# Patient Record
Sex: FEMALE | Race: WHITE | Employment: OTHER | URBAN - METROPOLITAN AREA
[De-identification: names, ages, dates, MRNs, and addresses within clinical notes are randomized per-mention and may not be internally consistent; named-entity substitution may affect disease eponyms.]

---

## 2022-09-03 ENCOUNTER — HOSPITAL ENCOUNTER (EMERGENCY)
Age: 77
Discharge: HOME | End: 2022-09-03
Payer: MEDICARE

## 2022-09-03 VITALS
OXYGEN SATURATION: 98 % | RESPIRATION RATE: 18 BRPM | HEART RATE: 99 BPM | TEMPERATURE: 98.4 F | SYSTOLIC BLOOD PRESSURE: 132 MMHG | DIASTOLIC BLOOD PRESSURE: 73 MMHG

## 2022-09-03 DIAGNOSIS — H61.21 IMPACTED CERUMEN OF RIGHT EAR: Primary | ICD-10-CM

## 2022-09-03 PROCEDURE — 69209 REMOVE IMPACTED EAR WAX UNI: CPT

## 2022-09-03 PROCEDURE — 99283 EMERGENCY DEPT VISIT LOW MDM: CPT

## 2022-09-03 RX ORDER — CIPROFLOXACIN/HYDROCORTISONE 0.2 %-1 %
3 SUSPENSION, DROPS(FINAL DOSAGE FORM)(ML) OTIC (EAR) 2 TIMES DAILY
Qty: 1 EACH | Refills: 0 | Status: SHIPPED | OUTPATIENT
Start: 2022-09-03 | End: 2022-09-10

## 2022-09-03 ASSESSMENT — PAIN SCALES - GENERAL: PAINLEVEL_OUTOF10: 2

## 2022-09-03 ASSESSMENT — PAIN - FUNCTIONAL ASSESSMENT: PAIN_FUNCTIONAL_ASSESSMENT: 0-10

## 2022-09-03 NOTE — ED NOTES
Moderate ear wax removed from right ear, pt felt relief and increase in hearing post removal     Husamma Franky, RN  09/03/22 [id number]

## 2022-09-03 NOTE — ED PROVIDER NOTES
[facility] Emergency Department    CHIEF COMPLAINT  Ear Fullness (Right ear pain x2 weeks)      SHARED SERVICE VISIT:  Evaluated by [facility]. My supervising physician was available for consultation. HISTORY OF PRESENT ILLNESS  Yvette Rodriguez is a 76 y.o. female who presents to the ED complaining of 2-week history of low level right ear fullness and decreased hearing. States that she did have some discomfort in today attempting to remove some earwax. Mild bleeding was then noted. Patient denies any visual changes or disturbances. No dizziness or lightheadedness. She denies headaches or confusion. No fevers chills. No nasal congestion sore throat. No other complaints, modifying factors or associated symptoms. Nursing notes reviewed.    Past Medical History:   Diagnosis Date    Cancer ([facility] [address]     skin-cheek     Past Surgical History:   Procedure Laterality Date    APPENDECTOMY  second grade    UPPER GASTROINTESTINAL ENDOSCOPY  5/12/2015     Family History   Problem Relation Age of Onset    Cancer Mother         liver     Social History     Socioeconomic History    Marital status:      Spouse name: Not on file    Number of children: Not on file    Years of education: Not on file    Highest education level: Not on file   Occupational History    Not on file   Tobacco Use    Smoking status: Never    Smokeless tobacco: Never   Substance and Sexual Activity    Alcohol use: Yes     Comment: varies    Drug use: Not on file    Sexual activity: Not on file   Other Topics Concern    Not on file   Social History Narrative    Not on file     Social Determinants of Health     Financial Resource Strain: Not on file   Food Insecurity: Not on file   Transportation Needs: Not on file   Physical Activity: Not on file   Stress: Not on file   Social Connections: Not on file   Intimate Partner Violence: Not on file   Housing Stability: Not on file     No current facility-administered medications for this encounter. Current Outpatient Medications   Medication Sig Dispense Refill    ciprofloxacin-hydrocortisone (CIPRO HC) 0.2-1 % otic suspension Place 3 drops into the left ear 2 times daily for 7 days 1 each 0    dexamethasone (DECADRON) 4 MG tablet Take 4 mg by mouth 2 times daily (with meals)      Multiple Vitamins-Minerals (THERAPEUTIC MULTIVITAMIN-MINERALS) tablet Take 1 tablet by mouth daily       Allergies   Allergen Reactions    Codeine Nausea Only and Other (See Comments)     \"passed out\"    Streptomycin Nausea Only and Other (See Comments)     \"passed out\"    Iv Contrast [Iodides] Diarrhea, Itching, Nausea And Vomiting and Other (See Comments)     Itching, hypotension and syncope after 2nd CT with IV contrast.  Has n/v/d after first CT with contrast       REVIEW OF SYSTEMS  6 systems reviewed, pertinent positives per HPI otherwise noted to be negative    PHYSICAL EXAM  /73   Pulse 99   Temp 98.4 °F (36.9 °C) (Oral)   Resp 18   SpO2 98%   GENERAL APPEARANCE: Awake and alert. Cooperative. No acute distress. HEAD: Normocephalic. Atraumatic. EYES: PERRL. EOM's grossly intact. ENT: Mucous membranes are moist.  Patient with mild sore to the right ear canal.  Cerumen impaction was noted and resolved. No pain with manipulation of external ear. No mastoid tenderness. Canal is pink and pearly without bulge, retraction, or loss of landmarks. Left ear unremarkable. LYMPH: No periauricular, submental, or cervical lymphadenopathy.]  NECK: Supple. Normal ROM. CHEST: Equal symmetric chest rise. LUNGS: Breathing is unlabored. Speaking comfortably in full sentences. Abdomen: Nondistended  EXTREMITIES: MAEE. No acute deformities. SKIN: Warm and dry. NEUROLOGICAL: Alert and oriented. Strength is 5/5 in all extremities and sensation is intact. ED COURSE  Pain control was not required while here in the emergency department.   Cerumen removed and on reevaluation patient states that she

## 2023-02-11 ENCOUNTER — OFFICE VISIT (OUTPATIENT)
Dept: URGENT CARE | Age: 78
End: 2023-02-11

## 2023-02-11 VITALS
HEART RATE: 85 BPM | TEMPERATURE: 98.7 F | BODY MASS INDEX: 18.12 KG/M2 | WEIGHT: 96 LBS | DIASTOLIC BLOOD PRESSURE: 77 MMHG | HEIGHT: 61 IN | OXYGEN SATURATION: 96 % | SYSTOLIC BLOOD PRESSURE: 127 MMHG

## 2023-02-11 DIAGNOSIS — L50.8 ACUTE URTICARIA: ICD-10-CM

## 2023-02-11 DIAGNOSIS — T78.1XXA ALLERGIC REACTION TO FOOD, INITIAL ENCOUNTER: Primary | ICD-10-CM

## 2023-02-11 RX ORDER — CYCLOBENZAPRINE HCL 5 MG
TABLET ORAL
COMMUNITY
Start: 2023-01-13

## 2023-02-11 RX ORDER — CARBAMAZEPINE 300 MG/1
CAPSULE, EXTENDED RELEASE ORAL
COMMUNITY
Start: 2023-01-21

## 2023-02-11 RX ORDER — DIPHENHYDRAMINE HCL 25 MG
25 TABLET ORAL 2 TIMES DAILY
Qty: 60 TABLET | Refills: 0 | Status: SHIPPED | OUTPATIENT
Start: 2023-02-11 | End: 2023-03-13

## 2023-02-11 RX ORDER — FAMOTIDINE 20 MG/1
20 TABLET, FILM COATED ORAL 2 TIMES DAILY
Qty: 28 TABLET | Refills: 0 | Status: SHIPPED | OUTPATIENT
Start: 2023-02-11 | End: 2023-02-25

## 2023-02-11 RX ADMIN — Medication 25 MG: at 12:13

## 2023-02-11 NOTE — PATIENT INSTRUCTIONS
Start pepcid and benadryl twice daily for five days  May take Benadryl as needed every 6 hours for itching  Follow up with PCP if no improvement  Go to the ER for shortness of breath, trouble swallowing or increased swelling of face.

## 2023-02-11 NOTE — PROGRESS NOTES
Noelle Delgado (:  1945) is a [de-identified] y.o. female,New patient, here for evaluation of the following chief complaint(s):  Rash (Rash on face. Itchy last night, red rash this morning. Burns and itches. also couldn't see out of Right eye for a short time. Has infusions via IV for her bone marrow for red and white blood cell count. Had one 2 days ago and 3 months prior. Did not have reaction the last time. )      ASSESSMENT/PLAN:  1. Allergic reaction to food, initial encounter  Facial swelling, redness and itching improved with Benadryl in office. Start pepcid and benadryl twice daily  Avoid jalapeno  Go to the ER if swelling increases, become SOB or have trouble swallowing.    - famotidine (PEPCID) 20 MG tablet; Take 1 tablet by mouth 2 times daily for 14 days  Dispense: 28 tablet; Refill: 0  - diphenhydrAMINE (BENADRYL) 25 MG tablet; Take 1 tablet by mouth 2 times daily  Dispense: 60 tablet; Refill: 0    2. Acute urticaria  Orders Placed This Encounter   Medications    diphenhydrAMINE (BENYLIN) 12.5 MG/5ML liquid 25 mg    famotidine (PEPCID) 20 MG tablet     Sig: Take 1 tablet by mouth 2 times daily for 14 days     Dispense:  28 tablet     Refill:  0    diphenhydrAMINE (BENADRYL) 25 MG tablet     Sig: Take 1 tablet by mouth 2 times daily     Dispense:  60 tablet     Refill:  0    Patient had noticeable relief of redness and swelling of right side of face within minutes of benadryl dose.     - diphenhydrAMINE (BENYLIN) 12.5 MG/5ML liquid 25 mg     Return if symptoms worsen or fail to improve. SUBJECTIVE/OBJECTIVE:  Patient comes in today for rash to face since yesterday, rash worsened overnight. States she is allergic to jalopenos and accidentally ate one yesterday on sandwich. Denies any other allergies, new foods, soap, detergents or environmental exposure      History provided by:  Patient   used: No    Rash  This is a new problem. The current episode started yesterday.  The problem has been gradually worsening since onset. The affected locations include the face and lips. The rash is characterized by redness and swelling. Associated with: Jalopenos. Associated symptoms include facial edema. Pertinent negatives include no fever, shortness of breath, sore throat or vomiting. Past treatments include nothing. Vitals:    02/11/23 1131   BP: 127/77   Site: Left Upper Arm   Position: Sitting   Pulse: 85   Temp: 98.7 °F (37.1 °C)   TempSrc: Oral   SpO2: 96%   Weight: 96 lb (43.5 kg)   Height: 5' 1\" (1.549 m)       Review of Systems   Constitutional:  Negative for fever. HENT:  Negative for sore throat. Respiratory:  Negative for shortness of breath. Gastrointestinal:  Negative for vomiting. Skin:  Positive for rash. Physical Exam  Vitals reviewed. Constitutional:       Appearance: Normal appearance. HENT:      Head: Normocephalic and atraumatic. Eyes:      Pupils: Pupils are equal, round, and reactive to light. Cardiovascular:      Rate and Rhythm: Normal rate and regular rhythm. Pulses: Normal pulses. Heart sounds: Normal heart sounds. No murmur heard. No friction rub. No gallop. Pulmonary:      Effort: Pulmonary effort is normal.      Breath sounds: Normal breath sounds. Skin:     General: Skin is warm and dry. Capillary Refill: Capillary refill takes less than 2 seconds. Findings: Rash present. Comments: Urticaria and facial swelling around mouth, under eyes and forehead. Symptoms are stable. Neurological:      Mental Status: She is alert and oriented to person, place, and time. An electronic signature was used to authenticate this note.     --DERIK Holbrook - CNP

## 2023-02-12 ASSESSMENT — ENCOUNTER SYMPTOMS
VOMITING: 0
SORE THROAT: 0
SHORTNESS OF BREATH: 0

## 2025-07-31 ENCOUNTER — APPOINTMENT (OUTPATIENT)
Dept: GENERAL RADIOLOGY | Age: 80
End: 2025-07-31
Payer: MEDICARE

## 2025-07-31 ENCOUNTER — APPOINTMENT (OUTPATIENT)
Dept: CT IMAGING | Age: 80
End: 2025-07-31
Payer: MEDICARE

## 2025-07-31 ENCOUNTER — HOSPITAL ENCOUNTER (EMERGENCY)
Age: 80
Discharge: HOME | End: 2025-07-31
Attending: EMERGENCY MEDICINE
Payer: MEDICARE

## 2025-07-31 VITALS
OXYGEN SATURATION: 100 % | BODY MASS INDEX: 15.85 KG/M2 | TEMPERATURE: 97.9 F | DIASTOLIC BLOOD PRESSURE: 43 MMHG | RESPIRATION RATE: 16 BRPM | WEIGHT: 83.9 LBS | SYSTOLIC BLOOD PRESSURE: 98 MMHG | HEART RATE: 96 BPM
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
## 2025-07-31 DIAGNOSIS — S00.81XA ABRASION OF FOREHEAD, INITIAL ENCOUNTER: ICD-10-CM

## 2025-07-31 PROCEDURE — 73060 X-RAY EXAM OF HUMERUS: CPT

## 2025-07-31 PROCEDURE — 99284 EMERGENCY DEPT VISIT MOD MDM: CPT

## 2025-07-31 PROCEDURE — 72125 CT NECK SPINE W/O DYE: CPT

## 2025-07-31 PROCEDURE — 70450 CT HEAD/BRAIN W/O DYE: CPT

## 2025-07-31 PROCEDURE — 6360000002 HC RX W HCPCS: Performed by: EMERGENCY MEDICINE

## 2025-07-31 PROCEDURE — 90715 TDAP VACCINE 7 YRS/> IM: CPT | Performed by: EMERGENCY MEDICINE

## 2025-07-31 PROCEDURE — 90471 IMMUNIZATION ADMIN: CPT | Performed by: EMERGENCY MEDICINE

## 2025-07-31 PROCEDURE — 73090 X-RAY EXAM OF FOREARM: CPT

## 2025-07-31 RX ADMIN — TETANUS TOXOID, REDUCED DIPHTHERIA TOXOID AND ACELLULAR PERTUSSIS VACCINE, ADSORBED 0.5 ML: 5; 2.5; 8; 8; 2.5 SUSPENSION INTRAMUSCULAR at 05:43

## 2025-07-31 ASSESSMENT — PAIN - FUNCTIONAL ASSESSMENT: PAIN_FUNCTIONAL_ASSESSMENT: NONE - DENIES PAIN

## 2025-07-31 NOTE — DISCHARGE INSTRUCTIONS
Use Tylenol or ibuprofen as needed for pain.  Use ice over sore areas for the next 48 hours, then use heat.  Clean forehead abrasion as needed.  Follow-up with primary care provider as needed

## 2025-07-31 NOTE — ED PROVIDER NOTES
[facility]  EMERGENCY DEPARTMENT ENCOUNTER          ATTENDING PHYSICIAN NOTE       Date of evaluation: 7/31/2025    Chief Complaint     Fall and Head Laceration (Patient brought in by ems from [facility] for a fall out of bed with head laceration.)      History of Present Illness     Lou Humphrey is a 80 y.o. female who presents to the emergency department for evaluation of injuries after a fall.  Patient has a history of dementia so is unable to provide history but per EMS patient fell out of bed.  She did have a large amount of blood to her head that on evaluation after cleaning there was only a small abrasion present.  She is unable to tell me if she is having pain anywhere else.  She is not sure of her last tetanus shot.    ASSESSMENT / PLAN  (MEDICAL DECISION MAKING)     INITIAL VITALS: BP: (!) 98/43, Temp: 97.9 °F (36.6 °C), Pulse: 96, Respirations: 16, SpO2: 100 %      Lou Humphrey is a 80 y.o. female presents for evaluation of injuries after falling out of bed.  Patient has a small abrasion to the forehead but otherwise no overt signs of trauma.  She did complain of pain to her right forearm and left upper arm.  X-rays of these are unremarkable.  Patient did have a CT scan of her head and cervical spine which were unremarkable.  Patient's family did arrive and confirm she is at her mental status baseline.  Patient's tetanus was updated.  Patient be discharged back to her care facility for continued chronic care.    Is this patient to be included in the SEP-1 core measure? No Exclusion criteria - the patient is NOT to be included for SEP-1 Core Measure due to: Infection is not suspected    Medical Decision Making  Amount and/or Complexity of Data Reviewed  Radiology: ordered.    Risk  Prescription drug management.        Clinical Impression     1. Fall from bed, initial encounter    2. Abrasion of forehead, initial encounter        Disposition     PATIENT REFERRED TO:  No follow-up provider